# Patient Record
Sex: FEMALE | Race: WHITE | NOT HISPANIC OR LATINO | Employment: UNEMPLOYED | URBAN - METROPOLITAN AREA
[De-identification: names, ages, dates, MRNs, and addresses within clinical notes are randomized per-mention and may not be internally consistent; named-entity substitution may affect disease eponyms.]

---

## 2023-08-10 ENCOUNTER — OFFICE VISIT (OUTPATIENT)
Dept: FAMILY MEDICINE | Facility: CLINIC | Age: 23
End: 2023-08-10
Payer: COMMERCIAL

## 2023-08-10 VITALS
OXYGEN SATURATION: 98 % | TEMPERATURE: 99 F | HEART RATE: 117 BPM | SYSTOLIC BLOOD PRESSURE: 130 MMHG | BODY MASS INDEX: 20.22 KG/M2 | WEIGHT: 136.5 LBS | RESPIRATION RATE: 18 BRPM | HEIGHT: 69 IN | DIASTOLIC BLOOD PRESSURE: 86 MMHG

## 2023-08-10 DIAGNOSIS — Z13.1 SCREENING FOR DIABETES MELLITUS: ICD-10-CM

## 2023-08-10 DIAGNOSIS — R00.2 PALPITATIONS: ICD-10-CM

## 2023-08-10 DIAGNOSIS — R63.4 UNINTENDED WEIGHT LOSS: Primary | ICD-10-CM

## 2023-08-10 DIAGNOSIS — Z13.0 SCREENING FOR DEFICIENCY ANEMIA: ICD-10-CM

## 2023-08-10 DIAGNOSIS — R23.3 EASY BRUISING: ICD-10-CM

## 2023-08-10 DIAGNOSIS — Z11.4 SCREENING FOR HIV (HUMAN IMMUNODEFICIENCY VIRUS): ICD-10-CM

## 2023-08-10 DIAGNOSIS — M24.80 GENERALIZED HYPERMOBILITY OF JOINTS: ICD-10-CM

## 2023-08-10 DIAGNOSIS — Z13.29 SCREENING FOR THYROID DISORDER: ICD-10-CM

## 2023-08-10 LAB
ALBUMIN SERPL BCG-MCNC: 4.8 G/DL (ref 3.5–5.2)
ALP SERPL-CCNC: 56 U/L (ref 35–129)
ALT SERPL W P-5'-P-CCNC: 13 U/L (ref 0–70)
ANION GAP SERPL CALCULATED.3IONS-SCNC: 11 MMOL/L (ref 7–15)
AST SERPL W P-5'-P-CCNC: 25 U/L (ref 0–45)
BILIRUB SERPL-MCNC: 0.3 MG/DL
BUN SERPL-MCNC: 10.9 MG/DL (ref 6–20)
CALCIUM SERPL-MCNC: 10.5 MG/DL (ref 8.6–10)
CHLORIDE SERPL-SCNC: 102 MMOL/L (ref 98–107)
CREAT SERPL-MCNC: 0.71 MG/DL (ref 0.51–1.17)
DEPRECATED HCO3 PLAS-SCNC: 25 MMOL/L (ref 22–29)
ERYTHROCYTE [DISTWIDTH] IN BLOOD BY AUTOMATED COUNT: 11.9 % (ref 10–15)
GFR SERPL CREATININE-BSD FRML MDRD: >90 ML/MIN/1.73M2
GLUCOSE SERPL-MCNC: 91 MG/DL (ref 70–99)
HCT VFR BLD AUTO: 41.8 % (ref 35–53)
HGB BLD-MCNC: 13.7 G/DL (ref 11.7–17.7)
INR PPP: 1 (ref 0.85–1.15)
MCH RBC QN AUTO: 29.3 PG (ref 26.5–33)
MCHC RBC AUTO-ENTMCNC: 32.8 G/DL (ref 31.5–36.5)
MCV RBC AUTO: 89 FL (ref 78–100)
PLATELET # BLD AUTO: 357 10E3/UL (ref 150–450)
POTASSIUM SERPL-SCNC: 4.4 MMOL/L (ref 3.4–5.3)
PROT SERPL-MCNC: 7.7 G/DL (ref 6.4–8.3)
RBC # BLD AUTO: 4.68 10E6/UL (ref 3.8–5.9)
SODIUM SERPL-SCNC: 138 MMOL/L (ref 136–145)
TSH SERPL DL<=0.005 MIU/L-ACNC: 0.7 UIU/ML (ref 0.3–4.2)
WBC # BLD AUTO: 9.2 10E3/UL (ref 4–11)

## 2023-08-10 PROCEDURE — 84443 ASSAY THYROID STIM HORMONE: CPT | Performed by: FAMILY MEDICINE

## 2023-08-10 PROCEDURE — 99204 OFFICE O/P NEW MOD 45 MIN: CPT | Performed by: FAMILY MEDICINE

## 2023-08-10 PROCEDURE — 36415 COLL VENOUS BLD VENIPUNCTURE: CPT | Performed by: FAMILY MEDICINE

## 2023-08-10 PROCEDURE — 85027 COMPLETE CBC AUTOMATED: CPT | Performed by: FAMILY MEDICINE

## 2023-08-10 PROCEDURE — 85610 PROTHROMBIN TIME: CPT | Performed by: FAMILY MEDICINE

## 2023-08-10 PROCEDURE — 80053 COMPREHEN METABOLIC PANEL: CPT | Performed by: FAMILY MEDICINE

## 2023-08-10 PROCEDURE — 87389 HIV-1 AG W/HIV-1&-2 AB AG IA: CPT | Performed by: FAMILY MEDICINE

## 2023-08-10 RX ORDER — LISDEXAMFETAMINE DIMESYLATE 50 MG
1 CAPSULE ORAL EVERY MORNING
COMMUNITY
Start: 2023-02-01

## 2023-08-10 RX ORDER — HYDROXYZINE HYDROCHLORIDE 25 MG/1
25 TABLET, FILM COATED ORAL 2 TIMES DAILY PRN
COMMUNITY
Start: 2023-03-01

## 2023-08-10 RX ORDER — MULTIPLE VITAMINS W/ MINERALS TAB 9MG-400MCG
1 TAB ORAL DAILY
COMMUNITY

## 2023-08-10 RX ORDER — MEMANTINE HYDROCHLORIDE 5 MG/1
TABLET ORAL
COMMUNITY
Start: 2023-06-28

## 2023-08-10 ASSESSMENT — PAIN SCALES - GENERAL: PAINLEVEL: NO PAIN (0)

## 2023-08-10 NOTE — PROGRESS NOTES
Assessment/Plan.    Concern for connective tissue disorder.  I agree with patient that several of her symptoms are suggestive of this.  However, I do not think that connective tissue disorder would fully explain the extent of her recent weight loss.  -  Labs as noted below  - consider referral to genetics    Unintentional weight loss.  Low appetite could be secondary to medications or anxiety.  Differential also includes malabsorption and malignancy.  -labs as below    Palpitations associated with exertion and changes in position.  Concern for autonomic dysfunction.  ECG at [facility] was reassuring.  -  Orthostatic vitals today were borderline-abnormal: from standing to sitting, DBP dropped 10 mm Hg while pulse increased by 14/min  - check 48-hour rhythm monitor    Easy bruising, increase in intensity of menses.  -  Labs as below    Orders Placed This Encounter   Procedures    HIV Antigen Antibody Combo    Comprehensive metabolic panel (BMP + Alb, Alk Phos, ALT, AST, Total. Bili, TP)    CBC with platelets    INR    TSH with free T4 reflex    Adult Cardiac Event Monitor     ----  Chief complaint: Referral (PCP recommended patient get referral for EDS)    Social History     Social History Narrative    Updated 8/10/2023:  Lives with partner.  No kids.  Has cat.  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     Patient is concerned about EDS.  She thinks that this may explain a variety of symptoms she has been experiencing.    Patient was diagnosed with hypermobility at age 11.  She denies a history of joint dislocation requiring reduction.  She does experience diffuse joint pain.  This has been occurring since the end of high school.  Involved joints including knees, elbow, shoulders and hips.  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    Palpitations.  Patient first noticed these about 6 months ago.  Episodes typically last 5 to 10 minutes.   "They are frequently triggered by standing and exertion.  Symptoms improved with rest.  Associated dizziness and dyspnea.  Patiently occasionally experiences brief episodes of chest discomfort, but these are not consistently associated with the palpitations.  Denies a history of syncope or heart disease.    Unintentional weight loss.  Patient reports 60-pound weight loss in the past 7 to 9 months.  Decreased appetite.  Early satiety.  Chronic dysphagia: Had EGD in 2021 and reports being told that symptoms were secondary to reflux.  No vomiting.  No diarrhea.  Occasional abdominal pain.  Bowel movement every day to every third day.  Consumes about 4 small meals per day.  Has never had colonoscopy.  Uses nicotine vape and cannabis.  Otherwise denies recent alcohol or illicit drug use.    Fatigue.  About 6 months.  Patient describes this as a sensation of her muscles feeling sluggish.  Denies excessive sleepiness.  Not vegetarian.    Easy bruising.  About 5 months.  Patient is also noted heavier menses during this time.  Menses are regular.  Denies recent hematuria, blood in stool or melena.    Exam  /86 (BP Location: Right arm, Patient Position: Sitting, Cuff Size: Adult Regular)   Pulse 117   Temp 99  F (37.2  C) (Temporal)   Resp 18   Ht 1.75 m (5' 8.9\")   Wt 61.9 kg (136 lb 8 oz)   LMP 08/01/2023 (Within Days)   SpO2 98%   BMI 20.22 kg/m    Patient's last menstrual period was 08/01/2023 (within days).    Oropharynx without abnormal masses.  No thyromegaly.    Lung fields clear to auscultation bilaterally.  Tachycardic, regular rhythm, no murmurs.    Bruises on thighs and arms.  "

## 2023-08-10 NOTE — PROGRESS NOTES
"  {PROVIDER CHARTING PREFERENCE:522965}    Lien Luis is a 22 year old, presenting for the following health issues:  Referral (PCP recommended patient get referral for EDS)        8/10/2023     2:59 PM   Additional Questions   Roomed by Araceli DAILEY       History of Present Illness       Reason for visit:  Want to talk about eds and possible referral, as well as concerns with more frequent, unknown bruising.    Janelle Morin eats 2-3 servings of fruits and vegetables daily.Janelle Morin consumes 1 sweetened beverage(s) daily.Janelle Morin exercises with enough effort to increase Janelle Morin's heart rate 10 to 19 minutes per day.  Janelle Morin exercises with enough effort to increase Janelle Morin's heart rate 4 days per week.   Janelle Morin is not taking prescribed medications regularly due to remembering to take.       {SUPERLIST (Optional):346172}  {additonal problems for provider to add (Optional):521828}      Review of Systems   {ROS COMP (Optional):491190}      Objective    /86 (BP Location: Right arm, Patient Position: Sitting, Cuff Size: Adult Regular)   Pulse 117   Temp 99  F (37.2  C) (Temporal)   Resp 18   Ht 1.75 m (5' 8.9\")   Wt 61.9 kg (136 lb 8 oz)   LMP 08/01/2023 (Within Days)   SpO2 98%   BMI 20.22 kg/m    Body mass index is 20.22 kg/m .  Physical Exam   {Exam List (Optional):573971}    {Diagnostic Test Results (Optional):713922}    {AMBULATORY ATTESTATION (Optional):356506}              "

## 2023-08-11 ENCOUNTER — ANCILLARY PROCEDURE (OUTPATIENT)
Dept: CARDIOLOGY | Facility: CLINIC | Age: 23
End: 2023-08-11
Attending: FAMILY MEDICINE
Payer: COMMERCIAL

## 2023-08-11 DIAGNOSIS — R00.2 PALPITATIONS: ICD-10-CM

## 2023-08-11 LAB — HIV 1+2 AB+HIV1 P24 AG SERPL QL IA: NONREACTIVE

## 2023-08-11 PROCEDURE — 93270 REMOTE 30 DAY ECG REV/REPORT: CPT

## 2023-08-11 NOTE — PROGRESS NOTES
Per Dr. Mota, patient to have 48 hour Event monitor placed.  Diagnosis: Palpitations [R00.2]  Monitor placed: Yes  Patient Instructed: Yes  Patient verbalized understanding: Yes  Holter # [id number]

## 2023-08-13 PROBLEM — Z72.0 NICOTINE VAPOR PRODUCT USER: Status: ACTIVE | Noted: 2023-08-13

## 2023-08-13 PROBLEM — F41.9 ANXIETY AND DEPRESSION: Status: ACTIVE | Noted: 2019-03-05

## 2023-08-13 PROBLEM — F90.9 ADHD (ATTENTION DEFICIT HYPERACTIVITY DISORDER): Status: ACTIVE | Noted: 2023-08-13

## 2023-08-13 PROBLEM — R63.4 UNINTENDED WEIGHT LOSS: Status: ACTIVE | Noted: 2023-08-13

## 2023-08-13 PROBLEM — M19.171 POST-TRAUMATIC ARTHRITIS OF ANKLE, RIGHT: Status: ACTIVE | Noted: 2023-08-13

## 2023-08-13 PROBLEM — F32.A ANXIETY AND DEPRESSION: Status: ACTIVE | Noted: 2019-03-05

## 2023-08-13 PROBLEM — R00.2 PALPITATIONS: Status: ACTIVE | Noted: 2023-08-13

## 2023-08-15 PROBLEM — E83.52 HYPERCALCEMIA: Status: ACTIVE | Noted: 2023-08-15

## 2024-08-24 ENCOUNTER — HEALTH MAINTENANCE LETTER (OUTPATIENT)
Age: 24
End: 2024-08-24